# Patient Record
Sex: FEMALE | Race: BLACK OR AFRICAN AMERICAN | NOT HISPANIC OR LATINO | URBAN - METROPOLITAN AREA
[De-identification: names, ages, dates, MRNs, and addresses within clinical notes are randomized per-mention and may not be internally consistent; named-entity substitution may affect disease eponyms.]

---

## 2023-08-11 ENCOUNTER — WEB ENCOUNTER (OUTPATIENT)
Dept: URBAN - METROPOLITAN AREA CLINIC 84 | Facility: CLINIC | Age: 23
End: 2023-08-11

## 2023-08-11 ENCOUNTER — OFFICE VISIT (OUTPATIENT)
Dept: URBAN - METROPOLITAN AREA CLINIC 84 | Facility: CLINIC | Age: 23
End: 2023-08-11
Payer: MEDICAID

## 2023-08-11 ENCOUNTER — DASHBOARD ENCOUNTERS (OUTPATIENT)
Age: 23
End: 2023-08-11

## 2023-08-11 VITALS
BODY MASS INDEX: 32.94 KG/M2 | DIASTOLIC BLOOD PRESSURE: 74 MMHG | HEART RATE: 82 BPM | HEIGHT: 60 IN | WEIGHT: 167.8 LBS | TEMPERATURE: 97.2 F | SYSTOLIC BLOOD PRESSURE: 111 MMHG

## 2023-08-11 DIAGNOSIS — R10.13 EPIGASTRIC ABDOMINAL PAIN: ICD-10-CM

## 2023-08-11 DIAGNOSIS — D48.9 TERATOMA: ICD-10-CM

## 2023-08-11 PROCEDURE — 99204 OFFICE O/P NEW MOD 45 MIN: CPT | Performed by: INTERNAL MEDICINE

## 2023-08-11 RX ORDER — SERTRALINE HYDROCHLORIDE 50 MG/1
TAKE ONE TABLET BY MOUTH EVERY MORNING TABLET, FILM COATED ORAL
Qty: 30 UNSPECIFIED | Refills: 1 | Status: ON HOLD | COMMUNITY

## 2023-08-11 RX ORDER — ESCITALOPRAM OXALATE 10 MG/1
TAKE ONE TABLET BY MOUTH ONE TIME DAILY TABLET ORAL
Qty: 30 UNSPECIFIED | Refills: 1 | Status: ON HOLD | COMMUNITY

## 2023-08-11 RX ORDER — HALOPERIDOL 2 MG/1
TAKE TWO TABLETS BY MOUTH AT BEDTIME TABLET ORAL
Qty: 30 UNSPECIFIED | Refills: 0 | Status: ON HOLD | COMMUNITY

## 2023-08-11 RX ORDER — FERROUS SULFATE TAB 325 MG (65 MG ELEMENTAL FE) 325 (65 FE) MG
TAKE ONE TABLET BY MOUTH TWICE A DAY TAB ORAL
Qty: 60 UNSPECIFIED | Refills: 0 | Status: ON HOLD | COMMUNITY

## 2023-08-11 RX ORDER — FAMOTIDINE 20 MG/1
TAKE TWO TABLETS BY MOUTH AT BEDTIME TABLET, FILM COATED ORAL
Qty: 60 UNSPECIFIED | Refills: 0 | Status: ACTIVE | COMMUNITY

## 2023-08-11 RX ORDER — BUSPIRONE HYDROCHLORIDE 5 MG/1
TAKE ONE TABLET BY MOUTH TWICE A DAY TABLET ORAL
Qty: 60 UNSPECIFIED | Refills: 0 | Status: ON HOLD | COMMUNITY

## 2023-08-11 RX ORDER — IBUPROFEN 800 MG/1
TAKE ONE TABLET BY MOUTH EVERY 8 HOURS AS NEEDED FOR PAIN FOR 10 DAYS TABLET, FILM COATED ORAL
Qty: 60 UNSPECIFIED | Refills: 0 | Status: ON HOLD | COMMUNITY

## 2023-08-11 RX ORDER — OLANZAPINE 15 MG/1
TAKE ONE TABLET BY MOUTH AT BEDTIME TABLET, FILM COATED ORAL
Qty: 30 UNSPECIFIED | Refills: 0 | Status: ON HOLD | COMMUNITY

## 2023-08-11 RX ORDER — FERROUS SULFATE TAB EC 325 MG (65 MG FE EQUIVALENT) 325 (65 FE) MG
TAKE ONE TABLET BY MOUTH TWICE A DAY TABLET DELAYED RESPONSE ORAL
Qty: 60 UNSPECIFIED | Refills: 1 | Status: ON HOLD | COMMUNITY

## 2023-08-11 RX ORDER — DOCUSATE SODIUM 100 MG/1
TAKE ONE CAPSULE BY MOUTH EVERY MORNING AND TAKE ONE CAPSULE BY MOUTH AT BEDTIME FOR CONSTIPATION FOR 10 DAYS CAPSULE, LIQUID FILLED ORAL
Qty: 10 UNSPECIFIED | Refills: 0 | Status: ON HOLD | COMMUNITY

## 2023-08-11 RX ORDER — FLUOXETINE HYDROCHLORIDE 40 MG/1
TAKE ONE CAPSULE BY MOUTH ONE TIME DAILY CAPSULE ORAL
Qty: 15 UNSPECIFIED | Refills: 1 | Status: ON HOLD | COMMUNITY

## 2023-08-11 NOTE — PHYSICAL EXAM GASTROINTESTINAL
Abdomen , soft, nontender, nondistended , no guarding or rigidity , no masses palpable , normal bowel sounds , Liver and Spleen , no hepatomegaly present , no hepatosplenomegaly , liver nontender , spleen not palpable , epigastrium/periumbilical tenderness

## 2023-08-11 NOTE — HPI-TODAY'S VISIT:
22 year old woman presenting for ER follow up afor abdominal pain.  She went to the ED a few days ago for epiagstric pain.  ED records were reviewed iunclusing attending note, CT Scan report, and labs including BMP, CBC, and LFT's.  CT Scan was normal except for a teratoma.  She has been having the pain intermittently for the epigastric pain.  There are no obvious triggers.  There is associated nausea with occasional vomiting.  She denies UGI bleed.  The pain can last for a few hours.  The pain wakes her from sleep.  She denies NSAID's or alcohol.  The pain started after she had a baby about 6 months ago.  She has intermittent constipation/diarrhea over the past month.  She denies LGI bleed or melena.  She has not followed up with her Ob

## 2023-08-22 ENCOUNTER — OFFICE VISIT (OUTPATIENT)
Dept: URBAN - METROPOLITAN AREA SURGERY CENTER 20 | Facility: SURGERY CENTER | Age: 23
End: 2023-08-22

## 2023-08-22 ENCOUNTER — TELEPHONE ENCOUNTER (OUTPATIENT)
Dept: URBAN - METROPOLITAN AREA CLINIC 84 | Facility: CLINIC | Age: 23
End: 2023-08-22

## 2023-08-22 ENCOUNTER — CLAIMS CREATED FROM THE CLAIM WINDOW (OUTPATIENT)
Dept: URBAN - METROPOLITAN AREA SURGERY CENTER 20 | Facility: SURGERY CENTER | Age: 23
End: 2023-08-22
Payer: MEDICAID

## 2023-08-22 ENCOUNTER — CLAIMS CREATED FROM THE CLAIM WINDOW (OUTPATIENT)
Dept: URBAN - METROPOLITAN AREA CLINIC 4 | Facility: CLINIC | Age: 23
End: 2023-08-22
Payer: MEDICAID

## 2023-08-22 DIAGNOSIS — K31.89 OTHER DISEASES OF STOMACH AND DUODENUM: ICD-10-CM

## 2023-08-22 DIAGNOSIS — R10.13 ABDOMINAL DISCOMFORT, EPIGASTRIC: ICD-10-CM

## 2023-08-22 PROCEDURE — 88305 TISSUE EXAM BY PATHOLOGIST: CPT | Performed by: PATHOLOGY

## 2023-08-22 PROCEDURE — 88312 SPECIAL STAINS GROUP 1: CPT | Performed by: PATHOLOGY

## 2023-08-22 PROCEDURE — 43239 EGD BIOPSY SINGLE/MULTIPLE: CPT | Performed by: INTERNAL MEDICINE

## 2023-08-22 PROCEDURE — G8907 PT DOC NO EVENTS ON DISCHARG: HCPCS | Performed by: INTERNAL MEDICINE

## 2023-08-22 RX ORDER — BUSPIRONE HYDROCHLORIDE 5 MG/1
TAKE ONE TABLET BY MOUTH TWICE A DAY TABLET ORAL
Qty: 60 UNSPECIFIED | Refills: 0 | Status: ON HOLD | COMMUNITY

## 2023-08-22 RX ORDER — FAMOTIDINE 40 MG/1
1 TABLET TABLET, FILM COATED ORAL TWICE A DAY
Qty: 60 | Refills: 5 | OUTPATIENT
Start: 2023-08-22

## 2023-08-22 RX ORDER — SERTRALINE HYDROCHLORIDE 50 MG/1
TAKE ONE TABLET BY MOUTH EVERY MORNING TABLET, FILM COATED ORAL
Qty: 30 UNSPECIFIED | Refills: 1 | Status: ON HOLD | COMMUNITY

## 2023-08-22 RX ORDER — HALOPERIDOL 2 MG/1
TAKE TWO TABLETS BY MOUTH AT BEDTIME TABLET ORAL
Qty: 30 UNSPECIFIED | Refills: 0 | Status: ON HOLD | COMMUNITY

## 2023-08-22 RX ORDER — FERROUS SULFATE TAB EC 325 MG (65 MG FE EQUIVALENT) 325 (65 FE) MG
TAKE ONE TABLET BY MOUTH TWICE A DAY TABLET DELAYED RESPONSE ORAL
Qty: 60 UNSPECIFIED | Refills: 1 | Status: ON HOLD | COMMUNITY

## 2023-08-22 RX ORDER — FLUOXETINE HYDROCHLORIDE 40 MG/1
TAKE ONE CAPSULE BY MOUTH ONE TIME DAILY CAPSULE ORAL
Qty: 15 UNSPECIFIED | Refills: 1 | Status: ON HOLD | COMMUNITY

## 2023-08-22 RX ORDER — DOCUSATE SODIUM 100 MG/1
TAKE ONE CAPSULE BY MOUTH EVERY MORNING AND TAKE ONE CAPSULE BY MOUTH AT BEDTIME FOR CONSTIPATION FOR 10 DAYS CAPSULE, LIQUID FILLED ORAL
Qty: 10 UNSPECIFIED | Refills: 0 | Status: ON HOLD | COMMUNITY

## 2023-08-22 RX ORDER — ESCITALOPRAM OXALATE 10 MG/1
TAKE ONE TABLET BY MOUTH ONE TIME DAILY TABLET ORAL
Qty: 30 UNSPECIFIED | Refills: 1 | Status: ON HOLD | COMMUNITY

## 2023-08-22 RX ORDER — FERROUS SULFATE TAB 325 MG (65 MG ELEMENTAL FE) 325 (65 FE) MG
TAKE ONE TABLET BY MOUTH TWICE A DAY TAB ORAL
Qty: 60 UNSPECIFIED | Refills: 0 | Status: ON HOLD | COMMUNITY

## 2023-08-22 RX ORDER — OLANZAPINE 15 MG/1
TAKE ONE TABLET BY MOUTH AT BEDTIME TABLET, FILM COATED ORAL
Qty: 30 UNSPECIFIED | Refills: 0 | Status: ON HOLD | COMMUNITY

## 2023-08-22 RX ORDER — IBUPROFEN 800 MG/1
TAKE ONE TABLET BY MOUTH EVERY 8 HOURS AS NEEDED FOR PAIN FOR 10 DAYS TABLET, FILM COATED ORAL
Qty: 60 UNSPECIFIED | Refills: 0 | Status: ON HOLD | COMMUNITY

## 2023-08-22 RX ORDER — FAMOTIDINE 20 MG/1
TAKE TWO TABLETS BY MOUTH AT BEDTIME TABLET, FILM COATED ORAL
Qty: 60 UNSPECIFIED | Refills: 0 | Status: ACTIVE | COMMUNITY

## 2023-11-16 ENCOUNTER — OFFICE VISIT (OUTPATIENT)
Dept: URBAN - METROPOLITAN AREA CLINIC 84 | Facility: CLINIC | Age: 23
End: 2023-11-16

## 2023-11-16 RX ORDER — HALOPERIDOL 2 MG/1
TAKE TWO TABLETS BY MOUTH AT BEDTIME TABLET ORAL
Qty: 30 UNSPECIFIED | Refills: 0 | Status: ON HOLD | COMMUNITY

## 2023-11-16 RX ORDER — DOCUSATE SODIUM 100 MG/1
TAKE ONE CAPSULE BY MOUTH EVERY MORNING AND TAKE ONE CAPSULE BY MOUTH AT BEDTIME FOR CONSTIPATION FOR 10 DAYS CAPSULE, LIQUID FILLED ORAL
Qty: 10 UNSPECIFIED | Refills: 0 | Status: ON HOLD | COMMUNITY

## 2023-11-16 RX ORDER — FERROUS SULFATE TAB 325 MG (65 MG ELEMENTAL FE) 325 (65 FE) MG
TAKE ONE TABLET BY MOUTH TWICE A DAY TAB ORAL
Qty: 60 UNSPECIFIED | Refills: 0 | Status: ON HOLD | COMMUNITY

## 2023-11-16 RX ORDER — FERROUS SULFATE TAB EC 325 MG (65 MG FE EQUIVALENT) 325 (65 FE) MG
TAKE ONE TABLET BY MOUTH TWICE A DAY TABLET DELAYED RESPONSE ORAL
Qty: 60 UNSPECIFIED | Refills: 1 | Status: ON HOLD | COMMUNITY

## 2023-11-16 RX ORDER — ESCITALOPRAM OXALATE 10 MG/1
TAKE ONE TABLET BY MOUTH ONE TIME DAILY TABLET ORAL
Qty: 30 UNSPECIFIED | Refills: 1 | Status: ON HOLD | COMMUNITY

## 2023-11-16 RX ORDER — BUSPIRONE HYDROCHLORIDE 5 MG/1
TAKE ONE TABLET BY MOUTH TWICE A DAY TABLET ORAL
Qty: 60 UNSPECIFIED | Refills: 0 | Status: ON HOLD | COMMUNITY

## 2023-11-16 RX ORDER — FAMOTIDINE 40 MG/1
1 TABLET TABLET, FILM COATED ORAL TWICE A DAY
Qty: 60 | Refills: 5 | Status: ACTIVE | COMMUNITY
Start: 2023-08-22

## 2023-11-16 RX ORDER — SERTRALINE HYDROCHLORIDE 50 MG/1
TAKE ONE TABLET BY MOUTH EVERY MORNING TABLET, FILM COATED ORAL
Qty: 30 UNSPECIFIED | Refills: 1 | Status: ON HOLD | COMMUNITY

## 2023-11-16 RX ORDER — OLANZAPINE 15 MG/1
TAKE ONE TABLET BY MOUTH AT BEDTIME TABLET, FILM COATED ORAL
Qty: 30 UNSPECIFIED | Refills: 0 | Status: ON HOLD | COMMUNITY

## 2023-11-16 RX ORDER — FAMOTIDINE 20 MG/1
TAKE TWO TABLETS BY MOUTH AT BEDTIME TABLET, FILM COATED ORAL
Qty: 60 UNSPECIFIED | Refills: 0 | Status: ACTIVE | COMMUNITY

## 2023-11-16 RX ORDER — FLUOXETINE HYDROCHLORIDE 40 MG/1
TAKE ONE CAPSULE BY MOUTH ONE TIME DAILY CAPSULE ORAL
Qty: 15 UNSPECIFIED | Refills: 1 | Status: ON HOLD | COMMUNITY

## 2023-11-16 RX ORDER — IBUPROFEN 800 MG/1
TAKE ONE TABLET BY MOUTH EVERY 8 HOURS AS NEEDED FOR PAIN FOR 10 DAYS TABLET, FILM COATED ORAL
Qty: 60 UNSPECIFIED | Refills: 0 | Status: ON HOLD | COMMUNITY

## 2023-12-08 ENCOUNTER — OFFICE VISIT (OUTPATIENT)
Dept: URBAN - METROPOLITAN AREA CLINIC 84 | Facility: CLINIC | Age: 23
End: 2023-12-08

## 2023-12-08 RX ORDER — DOCUSATE SODIUM 100 MG/1
TAKE ONE CAPSULE BY MOUTH EVERY MORNING AND TAKE ONE CAPSULE BY MOUTH AT BEDTIME FOR CONSTIPATION FOR 10 DAYS CAPSULE, LIQUID FILLED ORAL
Qty: 10 UNSPECIFIED | Refills: 0 | Status: ON HOLD | COMMUNITY

## 2023-12-08 RX ORDER — FERROUS SULFATE TAB 325 MG (65 MG ELEMENTAL FE) 325 (65 FE) MG
TAKE ONE TABLET BY MOUTH TWICE A DAY TAB ORAL
Qty: 60 UNSPECIFIED | Refills: 0 | Status: ON HOLD | COMMUNITY

## 2023-12-08 RX ORDER — FERROUS SULFATE TAB EC 325 MG (65 MG FE EQUIVALENT) 325 (65 FE) MG
TAKE ONE TABLET BY MOUTH TWICE A DAY TABLET DELAYED RESPONSE ORAL
Qty: 60 UNSPECIFIED | Refills: 1 | Status: ON HOLD | COMMUNITY

## 2023-12-08 RX ORDER — BUSPIRONE HYDROCHLORIDE 5 MG/1
TAKE ONE TABLET BY MOUTH TWICE A DAY TABLET ORAL
Qty: 60 UNSPECIFIED | Refills: 0 | Status: ON HOLD | COMMUNITY

## 2023-12-08 RX ORDER — ESCITALOPRAM OXALATE 10 MG/1
TAKE ONE TABLET BY MOUTH ONE TIME DAILY TABLET ORAL
Qty: 30 UNSPECIFIED | Refills: 1 | Status: ON HOLD | COMMUNITY

## 2023-12-08 RX ORDER — IBUPROFEN 800 MG/1
TAKE ONE TABLET BY MOUTH EVERY 8 HOURS AS NEEDED FOR PAIN FOR 10 DAYS TABLET, FILM COATED ORAL
Qty: 60 UNSPECIFIED | Refills: 0 | Status: ON HOLD | COMMUNITY

## 2023-12-08 RX ORDER — FAMOTIDINE 40 MG/1
1 TABLET TABLET, FILM COATED ORAL TWICE A DAY
Qty: 60 | Refills: 5 | Status: ACTIVE | COMMUNITY
Start: 2023-08-22

## 2023-12-08 RX ORDER — OLANZAPINE 15 MG/1
TAKE ONE TABLET BY MOUTH AT BEDTIME TABLET, FILM COATED ORAL
Qty: 30 UNSPECIFIED | Refills: 0 | Status: ON HOLD | COMMUNITY

## 2023-12-08 RX ORDER — FAMOTIDINE 20 MG/1
TAKE TWO TABLETS BY MOUTH AT BEDTIME TABLET, FILM COATED ORAL
Qty: 60 UNSPECIFIED | Refills: 0 | Status: ACTIVE | COMMUNITY

## 2023-12-08 RX ORDER — SERTRALINE HYDROCHLORIDE 50 MG/1
TAKE ONE TABLET BY MOUTH EVERY MORNING TABLET, FILM COATED ORAL
Qty: 30 UNSPECIFIED | Refills: 1 | Status: ON HOLD | COMMUNITY

## 2023-12-08 RX ORDER — HALOPERIDOL 2 MG/1
TAKE TWO TABLETS BY MOUTH AT BEDTIME TABLET ORAL
Qty: 30 UNSPECIFIED | Refills: 0 | Status: ON HOLD | COMMUNITY

## 2023-12-08 RX ORDER — FLUOXETINE HYDROCHLORIDE 40 MG/1
TAKE ONE CAPSULE BY MOUTH ONE TIME DAILY CAPSULE ORAL
Qty: 15 UNSPECIFIED | Refills: 1 | Status: ON HOLD | COMMUNITY
